# Patient Record
Sex: MALE | Race: WHITE | NOT HISPANIC OR LATINO | Employment: UNEMPLOYED | URBAN - METROPOLITAN AREA
[De-identification: names, ages, dates, MRNs, and addresses within clinical notes are randomized per-mention and may not be internally consistent; named-entity substitution may affect disease eponyms.]

---

## 2021-04-01 ENCOUNTER — OFFICE VISIT (OUTPATIENT)
Dept: URGENT CARE | Facility: CLINIC | Age: 36
End: 2021-04-01
Payer: COMMERCIAL

## 2021-04-01 VITALS
WEIGHT: 170 LBS | RESPIRATION RATE: 12 BRPM | TEMPERATURE: 98.1 F | SYSTOLIC BLOOD PRESSURE: 116 MMHG | BODY MASS INDEX: 25.18 KG/M2 | HEART RATE: 70 BPM | DIASTOLIC BLOOD PRESSURE: 82 MMHG | OXYGEN SATURATION: 97 % | HEIGHT: 69 IN

## 2021-04-01 DIAGNOSIS — R42 VERTIGO: ICD-10-CM

## 2021-04-01 PROCEDURE — 99204 OFFICE O/P NEW MOD 45 MIN: CPT | Performed by: PHYSICIAN ASSISTANT

## 2021-04-01 RX ORDER — MECLIZINE HYDROCHLORIDE 25 MG/1
25 TABLET ORAL 3 TIMES DAILY PRN
Qty: 21 TABLET | Refills: 0 | Status: SHIPPED | OUTPATIENT
Start: 2021-04-01 | End: 2021-04-08

## 2021-04-01 RX ORDER — ONDANSETRON 4 MG/1
4 TABLET, FILM COATED ORAL EVERY 4 HOURS PRN
Qty: 20 TABLET | Refills: 0 | Status: SHIPPED | OUTPATIENT
Start: 2021-04-01 | End: 2021-04-06

## 2021-04-01 ASSESSMENT — ENCOUNTER SYMPTOMS
VOMITING: 1
NAUSEA: 1
FEVER: 0
DIZZINESS: 1

## 2021-04-02 ASSESSMENT — ENCOUNTER SYMPTOMS
COUGH: 0
SORE THROAT: 0
CHANGE IN BOWEL HABIT: 0
CHILLS: 0

## 2021-04-02 NOTE — PROGRESS NOTES
"Subjective:   Cosme Emerson is a 35 y.o. male who presents for Dizziness (x4 days, worsening over time ) and Nausea        Dizziness  This is a new problem. Episode onset: 4 days  Associated symptoms include nausea and vomiting. Pertinent negatives include no change in bowel habit, chills, congestion, coughing, fever or sore throat. Treatments tried: dramamine  The treatment provided no relief.     Review of Systems   Constitutional: Negative for chills and fever.   HENT: Negative for congestion and sore throat.    Respiratory: Negative for cough.    Gastrointestinal: Positive for nausea and vomiting. Negative for change in bowel habit.   Neurological: Positive for dizziness.       PMH:  has no past medical history on file.  MEDS:   Current Outpatient Medications:   •  meclizine (ANTIVERT) 25 MG Tab, Take 1 tablet by mouth 3 times a day as needed for up to 7 days., Disp: 21 tablet, Rfl: 0  •  ondansetron (ZOFRAN) 4 MG Tab tablet, Take 1 tablet by mouth every four hours as needed for Nausea/Vomiting for up to 5 days., Disp: 20 tablet, Rfl: 0  ALLERGIES: No Known Allergies  SURGHX: History reviewed. No pertinent surgical history.  SOCHX:  reports that he has never smoked. He has never used smokeless tobacco. He reports previous alcohol use. He reports that he does not use drugs.  History reviewed. No pertinent family history.     Objective:   /82   Pulse 70   Temp 36.7 °C (98.1 °F) (Temporal)   Resp 12   Ht 1.753 m (5' 9\")   Wt 77.1 kg (170 lb)   SpO2 97%   BMI 25.10 kg/m²     Physical Exam  Vitals reviewed.   Constitutional:       General: He is not in acute distress.     Appearance: Normal appearance. He is well-developed. He is not ill-appearing.   HENT:      Head: Normocephalic and atraumatic.      Comments: Rotational nystagmus noted with [place]-Hallpike maneuver on the left side.  His symptoms were not reproduced with maneuver.     Right Ear: Tympanic membrane and external ear normal.      Left Ear: " Tympanic membrane and external ear normal.      Nose: Nose normal.      Mouth/Throat:      Mouth: Mucous membranes are moist.      Pharynx: No posterior oropharyngeal erythema.   Eyes:      General: No visual field deficit.     Extraocular Movements: Extraocular movements intact.      Conjunctiva/sclera: Conjunctivae normal.      Pupils: Pupils are equal, round, and reactive to light.   Neck:      Trachea: No tracheal deviation.   Cardiovascular:      Rate and Rhythm: Normal rate and regular rhythm.   Pulmonary:      Effort: Pulmonary effort is normal. No respiratory distress.      Breath sounds: Normal breath sounds. No wheezing.   Musculoskeletal:      Cervical back: Normal range of motion and neck supple.   Skin:     General: Skin is warm and dry.      Capillary Refill: Capillary refill takes less than 2 seconds.   Neurological:      General: No focal deficit present.      Mental Status: He is alert and oriented to person, place, and time.      Cranial Nerves: Cranial nerves are intact. No facial asymmetry.      Sensory: Sensation is intact. No sensory deficit.      Motor: Motor function is intact. No weakness, tremor, atrophy or pronator drift.      Coordination: Coordination is intact. Romberg sign negative. Coordination normal. Finger-Nose-Finger Test and Heel to Shin Test normal.      Gait: Gait is intact. Gait normal.   Psychiatric:         Mood and Affect: Mood normal.         Behavior: Behavior normal.           Assessment/Plan:     1. Vertigo  meclizine (ANTIVERT) 25 MG Tab    REFERRAL TO PHYSICAL THERAPY    ondansetron (ZOFRAN) 4 MG Tab tablet     Supportive care reviewed.  He will use meclizine and Zofran as needed for acute symptoms.  He will continue trying home maneuvers, information for modified assessment maneuver provided.  A referral was placed to follow-up with physical therapy for vestibular therapy.    If symptoms worsen or persist patient can return to clinic for reevaluation.  Red flags and  emergency room precautions discussed. Side effects of medication discussed. Patient and significant other confirmed understanding of information.    Please note that this dictation was created using voice recognition software. I have made every reasonable attempt to correct obvious errors, but I expect that there are errors of grammar and possibly content that I did not discover before finalizing the note.

## 2021-04-02 NOTE — PATIENT INSTRUCTIONS
Modified Semont maneuver    The modified Semont maneuver is illustrated for left posterior canalithiasis. The seated patient begins by turning the head 45º to the right. The patient then quickly drops the trunk to the left side (1) with the head turned 45º to the right. This position is held for at least 30 seconds or until any provoked vertigo subsides. The patient then quickly sits up and lies down on the right side without stopping in the upright position (2), all the while keeping the head turned 45º to the right so that the head now faces partly down into the bed. This position is held for 30 seconds or until vertigo subsides. Then patient returns to the upright position (3). This maneuver is repeated three times a day until the patient is asymptomatic. For right posterior canalithiasis, the maneuver must be performed in the opposite direction, starting with the head turned toward the left.          Vertigo  Vertigo is the feeling that you or your surroundings are moving when they are not. This feeling can come and go at any time. Vertigo often goes away on its own. Vertigo can be dangerous if it occurs while you are doing something that could endanger you or others, such as driving or operating machinery.  Your health care provider will do tests to determine the cause of your vertigo. Tests will also help your health care provider decide how best to treat your condition.  Follow these instructions at home:  Eating and drinking         · Drink enough fluid to keep your urine pale yellow.  · Do not drink alcohol.  Activity  · Return to your normal activities as told by your health care provider. Ask your health care provider what activities are safe for you.  · In the morning, first sit up on the side of the bed. When you feel okay, stand slowly while you hold onto something until you know that your balance is fine.  · Move slowly. Avoid sudden body or head movements or certain positions, as told by your health  care provider.  · If you have trouble walking or keeping your balance, try using a cane for stability. If you feel dizzy or unstable, sit down right away.  · Avoid doing any tasks that would cause danger to you or others if vertigo occurs.  · Avoid bending down if you feel dizzy. Place items in your home so that they are easy for you to reach without leaning over.  · Do not drive or use heavy machinery if you feel dizzy.  General instructions  · Take over-the-counter and prescription medicines only as told by your health care provider.  · Keep all follow-up visits as told by your health care provider. This is important.  Contact a health care provider if:  · Your medicines do not relieve your vertigo or they make it worse.  · You have a fever.  · Your condition gets worse or you develop new symptoms.  · Your family or friends notice any behavioral changes.  · Your nausea or vomiting gets worse.  · You have numbness or a prickling and tingling sensation in part of your body.  Get help right away if you:  · Have difficulty moving or speaking.  · Are always dizzy.  · Faint.  · Develop severe headaches.  · Have weakness in your hands, arms, or legs.  · Have changes in your hearing or vision.  · Develop a stiff neck.  · Develop sensitivity to light.  Summary  · Vertigo is the feeling that you or your surroundings are moving when they are not.  · Your health care provider will do tests to determine the cause of your vertigo.  · Follow instructions for home care. You may be told to avoid certain tasks, positions, or movements.  · Contact a health care provider if your medicines do not relieve your symptoms, or if you have a fever, nausea, vomiting, or changes in behavior.  · Get help right away if you have severe headaches or difficulty speaking, or you develop hearing or vision problems.  This information is not intended to replace advice given to you by your health care provider. Make sure you discuss any questions you  have with your health care provider.  Document Released: 09/27/2006 Document Revised: 11/11/2019 Document Reviewed: 11/11/2019  Elsevier Patient Education © 2020 [employer]

## 2021-04-07 ENCOUNTER — PHYSICAL THERAPY (OUTPATIENT)
Dept: PHYSICAL THERAPY | Facility: REHABILITATION | Age: 36
End: 2021-04-07
Attending: PHYSICIAN ASSISTANT
Payer: COMMERCIAL

## 2021-04-07 DIAGNOSIS — R42 VERTIGO: ICD-10-CM

## 2021-04-07 PROCEDURE — 97535 SELF CARE MNGMENT TRAINING: CPT

## 2021-04-07 ASSESSMENT — ENCOUNTER SYMPTOMS
PAIN SCALE AT HIGHEST: 8
PAIN SCALE AT LOWEST: 2
PAIN TIMING: WHEN ACTIVE
PAIN SCALE: 4

## 2021-04-07 NOTE — OP THERAPY EVALUATION
"  Outpatient Physical Therapy  VESTIBULAR EVALUATION    [address]  [address]  [place] [address]  Phone:  [phone]  Fax:  [phone]    Date of Evaluation: 04/07/2021    Patient: Cosme Emerson  YOB: 1985  MRN: [id number]     Referring Provider: Julisa Acharya P.A.-C.  [address]  [address]  [place] [address]   Referring Diagnosis: Vertigo [R42]     Time Calculation    Start time: 0915  Stop time: 1007 Time Calculation (min): 52 minutes           Chief Complaint: Vertigo    Visit Diagnoses     ICD-10-CM   1. Vertigo  R42         History of Present Illness:     Mechanism of injury:    Pt presents to PT with complaint of dizziness.  Presented to  on 4/1/21 with complaint of dizziness, nausea, vomiting x 4 days.  States he went for a walk after a workout and mid-walk began to feel odd/dizzy, like he needed to eat something. Went home, but the problem progressed.  Describes spinning sensation for about 3 days.  Improved if he held still in bed, worse with any mobility. Used dramamine with no relief.  UC note indicates positive torsional nystagmus in L Hallpike, but without sx reproduction for the patient.  Was sent home with meclizine and Zofran prn for acute symptoms and to continue trying home maneuvers with follow up in physical therapy for vestibular therapy. States he didn't have sign of ear infection, no preceding illness.  Did have his ears flushed at .     Pt reports feeling better overall.  \"I was able to be up and move when I used the meds.\" Was able to be active without any meds yesterday. Describes vision being out of focus, continues to be bothered by quick head movements, \"my balance feels a lot better than last week.\"  Reports any HT during gait was causing LOB.      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     Headaches: no headaches      Ear problems:  " None  Symptoms:     Current symptom ratin    At best symptom ratin    At worst symptom ratin    Symptom timing:  When active    Progression:  Improving  Social Support:     Lives in:  Multiple-level home    Lives with:  Spouse  Diagnostic Tests:     No diagnostic tests were performed to date    Treatments:     No prior treatments received    Patient goals:     Patient goals for therapy:  Improved balance    Other patient goals:  Resolve dizziness      No past medical history on file.  No past surgical history on file.  Social History     Tobacco Use   • Smoking status: Never Smoker   • Smokeless tobacco: Never Used   Substance Use Topics   • Alcohol use: Not Currently     Family and Occupational History     Socioeconomic History   • Marital status: Unknown     Spouse name: Not on file   • Number of children: Not on file   • Years of education: Not on file   • Highest education level: Not on file   Occupational History   • Not on file       Objective:    Gait:     Assessment: difficulty with concurrent head rotation  Vestibulospinal Exam:     MCTSIB:         Firm, eyes open: within normal limits        Firm, eyes closed: within normal limits        Foam, eyes open: within normal limits        Foam, eyes closed: 83% below normal        Composite Score: 43% below normal      Oculomotor Exam:         Details: No spontaneous nystagmus central gaze          Details: No spontaneous nystagmus eccentric gaze    No saccadic eye movements    Smooth pursuit present    Convergence:        Normal convergence    No skew    Comments:   Fixation blocked: GII L beating upward torsional nystagmus in central gaze and L gaze hold.   Active Range of Motion:     Within functional limits  Limb Ataxia Exam:     Finger-to-Nose:         Intention tremor: none    Dysdiadochokinesia: none.  Strength Exam:     Upper extremities within functional limits    Lower extremities within functional limits  Reflex Exam:     Davis reflex:  absent    Babinski sign: absent      Deep Tendon Reflexes:         Left L4 (Patellar): normal (2+)        Right L4 (Patellar): normal (2+)  Sensation Tests:     Left Light Touch Sensation:         All left lumbar dermatomes intact      Right Light Touch Sensation:         All right lumbar dermatomes intact      Vertebrobasilar Exam:    Comments: NEG seated active test  Vestibulo-Ocular Exam:     Abnormal head thrust test        Details: corrective saccade on head moving left  BPPV Exam:     Normal [place]-Hallpike    Normal straight head-hanging test    Normal roll test    Comments: Persistent L beat horizontal nystagmus in B hallpike.   Breathing-Related Tests:     Normal hyperventilation test    Normal Valsalva test        Therapeutic Treatments and Modalities:     1. Functional Training, Self Care (CPT 46408), Education: vestibular pathoanat, vestibular neuritis and resultant vestibular hypofunction, goals for VRT. Full VRT HO given: pt to focus on seated HT tasks, VOR and ankle sways for now. Discussed progression/regression. Goal to complete 15 min a day    Time-based treatments/modalities:    Physical Therapy Timed Treatment Charges  Functional training, self care minutes (CPT 12974): 20 minutes        Assessment:     Functional impairments:  Decreased gaze stabilization, decreased postural stability, gait abnormality/instability and decreased utilization of VIS/vest/somato    Other impairments:  Motion sensitivity    Assessment details:    Mr Emerson is a 35 y.o male who presents to PT with complaint of dizziness after an acute vertigo episode starting about 2 weeks ago.  No previous hx of vertigo.  PT evaluation is most consistent with vestibular hypofunction, likely residual from a neuritis event.  He demonstrates uncompensated L up torsional nystamgus with fixation blocked, decreased vestibular dominant balance and decreased VOR control.  As a result, he is unable to work, exercise and perform household  activities at his PLOF.  Skilled PT services are indicated for progressive VRT to assist with return to PLOF  Goals:     Short term goals:  - Improve VORx1 to 1 hz for 1 min with sx less than 3/10  - Improve cond 4 of MCTSIB to WNL    Short term goal time span:  1-2 weeks    Long term goals:  - Improve DHI to less than 15%  - Able to EC headshake on foam without LOB  - Indep with HEP    Long term goal time span:  6-8 weeks  Plan:     Therapy options:  Physical therapy treatment to continue    Planned therapy interventions:  Functional Training, Self Care (CPT 46058), Therapeutic Activities (CPT 17751), Therapeutic Exercise (CPT 34396), Gait Training (CPT 19082) and Neuromuscular Re-education (CPT 67374)    Frequency:  2x week    Duration in visits:  6    Discussed with:  Patient      Functional Assessment Used    DHI = 48%      Referring provider co-signature:  I have reviewed this plan of care and my co-signature certifies the need for services.    Certification Period: 04/07/2021 to  06/02/21    Physician Signature: ________________________________ Date: ______________

## 2021-04-08 NOTE — OP THERAPY DAILY TREATMENT
"  Outpatient Physical Therapy  DAILY TREATMENT     [facility] Physical Therapy [address].  [address]  [place] [address]  Phone:  [phone]  Fax:  [phone]    Date: 04/09/2021    Patient: Cosme Emerson  YOB: 1985  MRN: [id number]     Time Calculation    Start time: 0919  Stop time: 0945 Time Calculation (min): 26 minutes         Chief Complaint: Vertigo    Visit #: 2    SUBJECTIVE:  Improving.  Was able to work through the whole VRT packet. Unstable at first with gait/HTs and ball circles, but improved with practice    OBJECTIVE:      Therapeutic Treatments and Modalities:     1. Neuromuscular Re-education (CPT 57188)    Therapeutic Treatment and Modalities Summary:   Trampoline: each x 1 min  - bounce with stable gaze  - bounce with EC  - RLC with standard stance  - up, down, center with standard stance  - VORx1   - bows    STS to airex with EC-> EC with HT at the top. X 10 ea  Airex with hand to hand ball toss: letter santos out- x 1 min  Gait with \" x 1 min   2x4 gait: f/b EO x 2 laps, attempted EC but unable (performed on firm surface x 2 laps). Lateral walking EO on board    Time-based treatments/modalities:    Physical Therapy Timed Treatment Charges  Neuromusc re-ed, balance, coor, post minutes (CPT 42233): 26 minutes    ASSESSMENT:   Response to treatment: Excellent improvement in tolerance and control during vestibular stimulation. Good compliance at home. Able to advance to moderate level challenges.      PLAN/RECOMMENDATIONS:   Plan for treatment: therapy treatment to continue next visit.  Planned interventions for next visit: continue with current treatment. Cont VRT progressions for to work/hobbies. Ok to reduce to 1x/week       "

## 2021-04-09 ENCOUNTER — PHYSICAL THERAPY (OUTPATIENT)
Dept: PHYSICAL THERAPY | Facility: REHABILITATION | Age: 36
End: 2021-04-09
Attending: PHYSICIAN ASSISTANT
Payer: COMMERCIAL

## 2021-04-09 DIAGNOSIS — R42 VERTIGO: ICD-10-CM

## 2021-04-09 PROCEDURE — 97112 NEUROMUSCULAR REEDUCATION: CPT

## 2021-04-13 ENCOUNTER — APPOINTMENT (OUTPATIENT)
Dept: PHYSICAL THERAPY | Facility: REHABILITATION | Age: 36
End: 2021-04-13
Attending: PHYSICIAN ASSISTANT
Payer: COMMERCIAL

## 2021-04-16 ENCOUNTER — PHYSICAL THERAPY (OUTPATIENT)
Dept: PHYSICAL THERAPY | Facility: REHABILITATION | Age: 36
End: 2021-04-16
Attending: PHYSICIAN ASSISTANT
Payer: COMMERCIAL

## 2021-04-16 DIAGNOSIS — R42 VERTIGO: ICD-10-CM

## 2021-04-16 PROCEDURE — 97112 NEUROMUSCULAR REEDUCATION: CPT

## 2021-04-16 NOTE — OP THERAPY DAILY TREATMENT
Outpatient Physical Therapy  DAILY TREATMENT     [facility] Physical [address].  [address]  [place] [address]  Phone:  [phone]  Fax:  [phone]    Date: 04/16/2021    Patient: Cosme Emerson  YOB: 1985  MRN: [id number]     Time Calculation    Start time: 0919  Stop time: 0945 Time Calculation (min): 26 minutes         Chief Complaint: Vertigo    Visit #: 3    SUBJECTIVE:  Still bothered by quick head movements, but feeling about 85%    OBJECTIVE:      Therapeutic Treatments and Modalities:     1. Neuromuscular Re-education (CPT 93547)    Therapeutic Treatment and Modalities Summary:   - Elliptical: x 6 min- direction changes with feet, head turns, eyes closed,VORx1  - STS to airex with EC standard and narrow (bosu too difficult). Airex STS EC with HT at the top.   - BOSU balance with HTs x 10 ea  - BOSU ball toss: fwd, lateral x 1 min ea.  Rotation x 3 reps ea  - VORx1 with gait fwd/back  - VORx2 standing x 1 min  - 2x4 gait: fwd/back with EO and attempts with EC (successful x1)    Time-based treatments/modalities:    Physical Therapy Timed Treatment Charges  Neuromusc re-ed, balance, coor, post minutes (CPT 30768): 26 minutes    ASSESSMENT:   Response to treatment: Steady progress bw sessions, good ability to push through sx during exercises without nausea. Tolerating advanced tasks, but still with delayed VOR functionally and instability on BOSU with EC    PLAN/RECOMMENDATIONS:   Plan for treatment: therapy treatment to continue next visit.  Planned interventions for next visit: continue with current treatment. Computerized DVA?

## 2021-04-20 ENCOUNTER — APPOINTMENT (OUTPATIENT)
Dept: PHYSICAL THERAPY | Facility: REHABILITATION | Age: 36
End: 2021-04-20
Attending: PHYSICIAN ASSISTANT
Payer: COMMERCIAL

## 2021-04-23 ENCOUNTER — PHYSICAL THERAPY (OUTPATIENT)
Dept: PHYSICAL THERAPY | Facility: REHABILITATION | Age: 36
End: 2021-04-23
Attending: PHYSICIAN ASSISTANT
Payer: COMMERCIAL

## 2021-04-23 DIAGNOSIS — R42 VERTIGO: ICD-10-CM

## 2021-04-23 PROCEDURE — 97112 NEUROMUSCULAR REEDUCATION: CPT

## 2021-04-23 NOTE — OP THERAPY DAILY TREATMENT
Outpatient Physical Therapy  DAILY TREATMENT     [facility] [address].  [address]  [place] [address]  Phone:  [phone]  Fax:  [phone]    Date: 04/23/2021    Patient: Cosme Emerson  YOB: 1985  MRN: [id number]     Time Calculation    Start time: 0918  Stop time: 0945 Time Calculation (min): 27 minutes         Chief Complaint: Vertigo    Visit #: 4    SUBJECTIVE:  Improving in regard to balance control.  Working a lot on the BOSU.  Had 2 short bouts of vertigo: once during bench press and the other with lying down to the R side.     OBJECTIVE:      Therapeutic Treatments and Modalities:     1. Neuromuscular Re-education (CPT 15160)    Therapeutic Treatment and Modalities Summary:   - Reassessment of positional exam. NEG for BPPV, but cont R up torsional GI nystagmus present in this position.  - Brand-daroff: x 3 cycles  - Reassessment of MCTSIB: WNL  - Computerized DVA: L= .05 logmar, R= .54 logmar    Time-based treatments/modalities:    Physical Therapy Timed Treatment Charges  Neuromusc re-ed, balance, coor, post minutes (CPT 02396): 27 minutes    ASSESSMENT:   Response to treatment: Balance restored to WNL without additional vestibular loading.  VOR is the primary limit at this time, with extremely impaired gaze stability during R HT.  Pt to work VOR only to the R in seated for this week. Continue with HEP for balance.     PLAN/RECOMMENDATIONS:   Plan for treatment: therapy treatment to continue next visit.  Planned interventions for next visit: continue with current treatment.

## 2021-04-27 ENCOUNTER — APPOINTMENT (OUTPATIENT)
Dept: PHYSICAL THERAPY | Facility: REHABILITATION | Age: 36
End: 2021-04-27
Attending: PHYSICIAN ASSISTANT
Payer: COMMERCIAL

## 2021-04-29 ENCOUNTER — PHYSICAL THERAPY (OUTPATIENT)
Dept: PHYSICAL THERAPY | Facility: REHABILITATION | Age: 36
End: 2021-04-29
Attending: PHYSICIAN ASSISTANT
Payer: COMMERCIAL

## 2021-04-29 DIAGNOSIS — R42 VERTIGO: ICD-10-CM

## 2021-04-29 PROCEDURE — 97112 NEUROMUSCULAR REEDUCATION: CPT

## 2021-04-29 NOTE — OP THERAPY DAILY TREATMENT
Outpatient Physical Therapy  DAILY TREATMENT     [facility] Physical [address].  [address]  [place] [address]  Phone:  [phone]  Fax:  [phone]    Date: 04/29/2021    Patient: Cosme Emerson  YOB: 1985  MRN: [id number]     Time Calculation    Start time: 1016  Stop time: 1045 Time Calculation (min): 29 minutes         Chief Complaint: Vertigo    Visit #: 5    SUBJECTIVE:  I feel like my vision is still a problem, but a little better.     OBJECTIVE:      Therapeutic Treatments and Modalities:     1. Neuromuscular Re-education (CPT 16940)    Therapeutic Treatment and Modalities Summary:   - DVA practice: R only, metranome 150 bpm.  First attempt 6/10, second attempt 25/26 correct  - DVA testing: WNL with good symmetry R and L  - Gaze stab (for speed assessment): R= 88 deg/sec, L= 150 deg/sec    Time-based treatments/modalities:    Physical Therapy Timed Treatment Charges  Neuromusc re-ed, balance, coor, post minutes (CPT 79828): 29 minutes    ASSESSMENT:   Response to treatment: Continues to show drastic improvements bw sessions.  DVA now WNL, but only up to 88 deg/sec.  Will need to work on increased speeds in HEP in order to correct the residual sx fully.  Hold follow ups x 30 days to work on HEP.  Return if sx persist or fail to resolve.     PLAN/RECOMMENDATIONS:   Plan for treatment: therapy treatment to continue next visit.  Planned interventions for next visit: continue with current treatment.

## 2023-05-02 ENCOUNTER — OFFICE VISIT (OUTPATIENT)
Dept: URGENT CARE | Facility: CLINIC | Age: 38
End: 2023-05-02
Payer: COMMERCIAL

## 2023-05-02 VITALS
SYSTOLIC BLOOD PRESSURE: 118 MMHG | WEIGHT: 184 LBS | BODY MASS INDEX: 27.25 KG/M2 | DIASTOLIC BLOOD PRESSURE: 78 MMHG | HEART RATE: 68 BPM | TEMPERATURE: 98.6 F | OXYGEN SATURATION: 96 % | RESPIRATION RATE: 16 BRPM | HEIGHT: 69 IN

## 2023-05-02 DIAGNOSIS — J02.9 SORE THROAT: ICD-10-CM

## 2023-05-02 LAB
INT CON NEG: NORMAL
INT CON POS: NORMAL
S PYO AG THROAT QL: NEGATIVE

## 2023-05-02 PROCEDURE — 87880 STREP A ASSAY W/OPTIC: CPT | Performed by: FAMILY MEDICINE

## 2023-05-02 PROCEDURE — 99213 OFFICE O/P EST LOW 20 MIN: CPT | Performed by: FAMILY MEDICINE

## 2023-05-02 NOTE — PROGRESS NOTES
"  Subjective:      37 y.o. male presents to urgent care for sore throat that started on Saturday.  No other cold symptoms such as cough, headache, body ache, or fever. No tobacco product use.  No history of asthma or COPD.  He is fully vaccinated against COVID.  No known sick contacts.    He denies any other questions or concerns at this time.    Current problem list, medication, and past medical/surgical history were reviewed in Epic.    ROS  See HPI     Objective:      /78 (BP Location: Right arm, Patient Position: Sitting, BP Cuff Size: Adult)   Pulse 68   Temp 37 °C (98.6 °F) (Temporal)   Resp 16   Ht 1.753 m (5' 9\")   Wt 83.5 kg (184 lb)   SpO2 96%   BMI 27.17 kg/m²     Physical Exam  Constitutional:       General: He is not in acute distress.     Appearance: He is not diaphoretic.   HENT:      Right Ear: Tympanic membrane, ear canal and external ear normal.      Left Ear: Tympanic membrane, ear canal and external ear normal.      Mouth/Throat:      Tongue: Tongue does not deviate from midline.      Palate: No lesions.      Pharynx: Uvula midline. Posterior oropharyngeal erythema present.      Tonsils: No tonsillar exudate. 1+ on the right. 1+ on the left.   Cardiovascular:      Rate and Rhythm: Normal rate and regular rhythm.      Heart sounds: Normal heart sounds.   Pulmonary:      Effort: Pulmonary effort is normal. No respiratory distress.      Breath sounds: Normal breath sounds.   Neurological:      Mental Status: He is alert.   Psychiatric:         Mood and Affect: Affect normal.         Judgment: Judgment normal.     Assessment/Plan:     1. Sore throat  Rapid strep negative.  Most consistent with virus.  Tylenol, ibuprofen, and gargle with warm salt water as needed for symptomatic relief.  - POCT Rapid Strep A      Instructed to return to Urgent Care or nearest Emergency Department if symptoms fail to improve, for any change in condition, further concerns, or new concerning symptoms. " Patient states understanding of the plan of care and discharge instructions.    Rosemarie Lam M.D.